# Patient Record
Sex: MALE
[De-identification: names, ages, dates, MRNs, and addresses within clinical notes are randomized per-mention and may not be internally consistent; named-entity substitution may affect disease eponyms.]

---

## 2017-06-14 ENCOUNTER — HOSPITAL ENCOUNTER (EMERGENCY)
Dept: HOSPITAL 14 - H.ER | Age: 42
Discharge: HOME | End: 2017-06-14
Payer: COMMERCIAL

## 2017-06-14 VITALS — TEMPERATURE: 97.7 F | RESPIRATION RATE: 16 BRPM | SYSTOLIC BLOOD PRESSURE: 121 MMHG | DIASTOLIC BLOOD PRESSURE: 65 MMHG

## 2017-06-14 VITALS — OXYGEN SATURATION: 90 %

## 2017-06-14 VITALS — HEART RATE: 65 BPM

## 2017-06-14 DIAGNOSIS — T78.40XA: Primary | ICD-10-CM

## 2017-06-14 PROCEDURE — 96375 TX/PRO/DX INJ NEW DRUG ADDON: CPT

## 2017-06-14 PROCEDURE — 96374 THER/PROPH/DIAG INJ IV PUSH: CPT

## 2017-06-14 PROCEDURE — 94150 VITAL CAPACITY TEST: CPT

## 2017-06-14 PROCEDURE — 94640 AIRWAY INHALATION TREATMENT: CPT

## 2017-06-14 PROCEDURE — 96361 HYDRATE IV INFUSION ADD-ON: CPT

## 2017-06-14 PROCEDURE — 96376 TX/PRO/DX INJ SAME DRUG ADON: CPT

## 2017-06-14 PROCEDURE — 99285 EMERGENCY DEPT VISIT HI MDM: CPT

## 2017-06-14 RX ADMIN — DIPHENHYDRAMINE HYDROCHLORIDE STA MG: 50 INJECTION INTRAMUSCULAR; INTRAVENOUS at 21:27

## 2017-06-14 RX ADMIN — IPRATROPIUM BROMIDE AND ALBUTEROL SULFATE STA ML: .5; 3 SOLUTION RESPIRATORY (INHALATION) at 22:50

## 2017-06-14 NOTE — ED PDOC
HPI: Allergic Reaction


Time Seen by Provider: 17 21:16


Chief Complaint (Nursing): Allergic Reaction


Chief Complaint (Provider): Allergic Reaction


History Per: Patient


History/Exam Limitations: no limitations


Onset/Duration Of Symptoms: Mins (30 minutes prior to arrival)


Current Symptoms Are (Timing): Still Present


Possible Cause: Food (10 minutes after ingesting honey roasted almonds)


Associated Symptoms: Skin Rash, Swelling, Trouble Swallowing, Itching, Redness


Home/EMS Treatment: None


Severity: Moderate


Additional Complaint(s): 





41 year old  male with no pertinent medical history presents to the ED 

with complaints of a possible allergic reaction that started 30 minutes prior 

to arrival. Patient reports that he started showing symptoms of difficulty 

swallowing, itching, and throat tightness 10 minutes after eating honey roasted 

almonds. He denies having any previous allergic reactions. He denies having 

nausea, vomiting, diarrhea, cough, fevers, or any known allergies.





PMD: patient does not have a PMD





Past Medical History


Reviewed: Historical Data, Nursing Documentation, Vital Signs


Vital Signs: 


 Last Vital Signs











Temp  97.7 F   17 20:52


 


Pulse  70   17 20:52


 


Resp  16   17 20:52


 


BP  121/65   17 20:52


 


Pulse Ox  90 L  17 21:32














- Medical History


PMH: No Chronic Diseases





- Surgical History


Surgical History: No Surg Hx





- Family History


Family History: States: No Known Family Hx





- Social History


Alcohol: None


Drugs: Denies





- Home Medications


Home Medications: 


 Ambulatory Orders











 Medication  Instructions  Recorded


 


Cetirizine HCl [Zyrtec] 10 mg PO QAM #10 capsule 17


 


Famotidine [Pepcid] 20 mg PO Q12 #14 tab 17


 


Methylprednisolone [Medrol Dosepak] 4 mg PO ASDIR #1 pkg 17














- Allergies


Allergies/Adverse Reactions: 


 Allergies











Allergy/AdvReac Type Severity Reaction Status Date / Time


 


No Known Allergies Allergy   Verified 17 21:02














Review of Systems


ROS Statement: Except As Marked, All Systems Reviewed And Found Negative


Constitutional: Negative for: Fever


ENT: Positive for: Throat Swelling


Respiratory: Negative for: Cough


Gastrointestinal: Negative for: Nausea, Vomiting, Diarrhea


Skin: Positive for: Rash (itchiness)





Physical Exam





- Reviewed


Nursing Documentation Reviewed: Yes


Vital Signs Reviewed: Yes





- Physical Exam


Appears: Positive for: Well, Non-toxic, No Acute Distress


Head Exam: Positive for: ATRAUMATIC, NORMOCEPHALIC


Skin: Positive for: Warm, Dry, Rash (diffuse urticarial rash)


Cardiovascular/Chest: Positive for: Regular Rate, Rhythm


Respiratory: Positive for: Normal Breath Sounds.  Negative for: Respiratory 

Distress


Extremity: Positive for: Swelling (mild edema to extremities and erythema)


Neurologic/Psych: Positive for: Alert, Oriented (3x)





- ECG


O2 Sat by Pulse Oximetry: 90 (RA)


Pulse Ox Interpretation: Abnormal





- Critical Care


Total Time (In Min): 30





Disposition





- Clinical Impression


Clinical Impression: 


 Acute allergic reaction








- Patient ED Disposition


Is Patient to be Admitted: No


Counseled Patient/Family Regarding: Studies Performed, Diagnosis, Need For 

Followup, Rx Given





- Disposition


Referrals: 


Vito Magdaleno MD [Primary Care Provider] - 


Disposition: Routine/Home


Disposition Time: 23:10


Condition: STABLE


Prescriptions: 


Cetirizine HCl [Zyrtec] 10 mg PO QAM #10 capsule


Famotidine [Pepcid] 20 mg PO Q12 #14 tab


Methylprednisolone [Medrol Dosepak] 4 mg PO ASDIR #1 pkg


Instructions:  General Allergic Reaction (ED)


Forms:  [employer] (Swiss)


Print Language: [place]





Medical Decision Making


Medical Decision Makin:16


Initial impression: 41 year old male with an acute allergic reaction insetting 

of almond consumption.


Initial plan:


* benadryl 50mg IV


* IV NS 1,000ml IV 1,000mls/hr


* pepcid 20mg IVP x2


* solumedrol 125mg IVP


* reevaluation





2310: Patient reports complete relief in all symptoms and is stable for d/c.





Dx: allergic reaction


F/U PCP for allergist referral 


Rx: zyrtec, pepcid, medrol dosepak 





--------------------------------------------------------------------------------

----------------- 


Scribe Attestation:


Documented by Nicolasa Dahl, acting as a scribe for Rancho Hanson MD.


 


Provider Scribe Attestation:


All medical record entries made by the Scribe were at my direction and 

personally dictated by me. I have reviewed the chart and agree that the record 

accurately reflects my personal performance of the history, physical exam, 

medical decision making, and the department course for this patient. I have 

also personally directed, reviewed, and agree with the discharge instructions 

and disposition.

## 2018-09-14 ENCOUNTER — HOSPITAL ENCOUNTER (EMERGENCY)
Dept: HOSPITAL 14 - H.ER | Age: 43
Discharge: HOME | End: 2018-09-14
Payer: COMMERCIAL

## 2018-09-14 VITALS
HEART RATE: 66 BPM | RESPIRATION RATE: 16 BRPM | TEMPERATURE: 99.1 F | DIASTOLIC BLOOD PRESSURE: 46 MMHG | SYSTOLIC BLOOD PRESSURE: 120 MMHG

## 2018-09-14 VITALS — OXYGEN SATURATION: 99 %

## 2018-09-14 DIAGNOSIS — L23.7: Primary | ICD-10-CM

## 2018-09-14 PROCEDURE — 96372 THER/PROPH/DIAG INJ SC/IM: CPT

## 2018-09-14 PROCEDURE — 99282 EMERGENCY DEPT VISIT SF MDM: CPT

## 2018-09-14 NOTE — ED PDOC
HPI: Skin/Bite Injury


Time Seen by Provider: 18 13:11


Chief Complaint (Nursing): Abnormal Skin Integrity


Chief Complaint (Provider): rash


History Per: Patient


Additional Complaint(s): 


42-year-old male presents with itchy rash to entire body that started the day 

after he went hiking last week. Patient has been applying Benadryl cream which 

has not helped. He also took Allegra but still has itchiness and redness. He 

denies fever or chills. Denies any discharge.





PMD: none





Past Medical History


Reviewed: Historical Data, Nursing Documentation, Vital Signs


Vital Signs: 





 Last Vital Signs











Temp  98.0 F   18 13:07


 


Pulse  76   18 13:07


 


Resp  18   18 13:07


 


BP  122/71   18 13:07


 


Pulse Ox  99   18 13:07














- Medical History


PMH: No Chronic Diseases





- Surgical History


Surgical History: No Surg Hx





- Family History


Family History: States: No Known Family Hx





- Living Arrangements


Living Arrangements: With Family





- Social History


Current smoker - smoking cessation education provided: No


Alcohol: None


Drugs: Denies





- Home Medications


Home Medications: 


 Ambulatory Orders











 Medication  Instructions  Recorded


 


Cetirizine HCl [Zyrtec] 10 mg PO QAM #10 capsule 17


 


Famotidine [Pepcid] 20 mg PO Q12 #14 tab 17


 


Methylprednisolone [Medrol Dosepak] 4 mg PO ASDIR #1 pkg 17


 


Loratadine [Claritin] 10 mg PO DAILY #30 tab 18


 


predniSONE [predniSONE Tab] 10 mg PO ASDIR #43 tab 18














- Allergies


Allergies/Adverse Reactions: 


 Allergies











Allergy/AdvReac Type Severity Reaction Status Date / Time


 


No Known Allergies Allergy   Verified 18 13:07














Review of Systems


ROS Statement: Except As Marked, All Systems Reviewed And Found Negative


Constitutional: Negative for: Fever


Skin: Positive for: Rash





Physical Exam





- Reviewed


Nursing Documentation Reviewed: Yes


Vital Signs Reviewed: Yes





- Physical Exam


Appears: Positive for: Well, Non-toxic, No Acute Distress


Skin: Positive for: Normal Color, Rash (Maculopapular and Urticarial lesions 

noted to upper and lower extremities in linear distributions.)


Eye Exam: Positive for: Normal appearance


Cardiovascular/Chest: Positive for: Regular Rate, Rhythm


Respiratory: Positive for: Normal Breath Sounds.  Negative for: Wheezing, 

Respiratory Distress


Extremity: Positive for: Normal ROM.  Negative for: Pedal Edema


Neurologic/Psych: Positive for: Alert, Oriented





- ECG


O2 Sat by Pulse Oximetry: 99


Pulse Ox Interpretation: Normal





Medical Decision Making


Medical Decision Makin y/o with poison ivy dermatitis





Plan:


IM solumedrol





Patient given prescriptions for Claritin and prednisone taper course. He was 

advised to keep skin clean and dry. Patient referred to clinic for follow-up.





Disposition





- Clinical Impression


Clinical Impression: 


 Poison ivy dermatitis








- Patient ED Disposition


Is Patient to be Admitted: No


Counseled Patient/Family Regarding: Diagnosis, Need For Followup, Rx Given





- Disposition


Referrals: 


[facility] [Outside]


Disposition: Routine/Home


Disposition Time: 13:33


Condition: STABLE


Additional Instructions: 


Take rx meds as directed.  Do not apply any topical creams.  Wash skin daily 

with soap and water.  Follow up with clinic in 2-3 days. 


Prescriptions: 


Loratadine [Claritin] 10 mg PO DAILY #30 tab


predniSONE [predniSONE Tab] 10 mg PO ASDIR #43 tab


Instructions:  Poison Ivy